# Patient Record
Sex: MALE | Race: BLACK OR AFRICAN AMERICAN | NOT HISPANIC OR LATINO | URBAN - METROPOLITAN AREA
[De-identification: names, ages, dates, MRNs, and addresses within clinical notes are randomized per-mention and may not be internally consistent; named-entity substitution may affect disease eponyms.]

---

## 2020-02-07 ENCOUNTER — OFFICE VISIT - HEALTHEAST (OUTPATIENT)
Dept: INTERNAL MEDICINE | Facility: CLINIC | Age: 65
End: 2020-02-07

## 2020-02-07 DIAGNOSIS — Z00.00 ROUTINE GENERAL MEDICAL EXAMINATION AT A HEALTH CARE FACILITY: ICD-10-CM

## 2020-02-07 DIAGNOSIS — Z12.11 SPECIAL SCREENING FOR MALIGNANT NEOPLASMS, COLON: ICD-10-CM

## 2020-02-07 ASSESSMENT — MIFFLIN-ST. JEOR: SCORE: 1668.83

## 2020-02-12 ENCOUNTER — AMBULATORY - HEALTHEAST (OUTPATIENT)
Dept: LAB | Facility: CLINIC | Age: 65
End: 2020-02-12

## 2020-02-12 DIAGNOSIS — Z00.00 ROUTINE GENERAL MEDICAL EXAMINATION AT A HEALTH CARE FACILITY: ICD-10-CM

## 2020-02-12 LAB
ALBUMIN SERPL-MCNC: 3.5 G/DL (ref 3.5–5)
ALP SERPL-CCNC: 63 U/L (ref 45–120)
ALT SERPL W P-5'-P-CCNC: 27 U/L (ref 0–45)
ANION GAP SERPL CALCULATED.3IONS-SCNC: 10 MMOL/L (ref 5–18)
AST SERPL W P-5'-P-CCNC: 30 U/L (ref 0–40)
BASOPHILS # BLD AUTO: 0.1 THOU/UL (ref 0–0.2)
BASOPHILS NFR BLD AUTO: 1 % (ref 0–2)
BILIRUB SERPL-MCNC: 0.5 MG/DL (ref 0–1)
BUN SERPL-MCNC: 12 MG/DL (ref 8–22)
CALCIUM SERPL-MCNC: 9 MG/DL (ref 8.5–10.5)
CHLORIDE BLD-SCNC: 106 MMOL/L (ref 98–107)
CHOLEST SERPL-MCNC: 159 MG/DL
CO2 SERPL-SCNC: 26 MMOL/L (ref 22–31)
CREAT SERPL-MCNC: 1.22 MG/DL (ref 0.7–1.3)
EOSINOPHIL # BLD AUTO: 0.4 THOU/UL (ref 0–0.4)
EOSINOPHIL NFR BLD AUTO: 9 % (ref 0–6)
ERYTHROCYTE [DISTWIDTH] IN BLOOD BY AUTOMATED COUNT: 13.4 % (ref 11–14.5)
FASTING STATUS PATIENT QL REPORTED: YES
GFR SERPL CREATININE-BSD FRML MDRD: 60 ML/MIN/1.73M2
GLUCOSE BLD-MCNC: 80 MG/DL (ref 70–125)
HCT VFR BLD AUTO: 48.6 % (ref 40–54)
HCV AB SERPL QL IA: NEGATIVE
HDLC SERPL-MCNC: 55 MG/DL
HGB BLD-MCNC: 14.7 G/DL (ref 14–18)
LDLC SERPL CALC-MCNC: 92 MG/DL
LYMPHOCYTES # BLD AUTO: 1 THOU/UL (ref 0.8–4.4)
LYMPHOCYTES NFR BLD AUTO: 22 % (ref 20–40)
MCH RBC QN AUTO: 28.5 PG (ref 27–34)
MCHC RBC AUTO-ENTMCNC: 30.2 G/DL (ref 32–36)
MCV RBC AUTO: 94 FL (ref 80–100)
MONOCYTES # BLD AUTO: 0.5 THOU/UL (ref 0–0.9)
MONOCYTES NFR BLD AUTO: 10 % (ref 2–10)
NEUTROPHILS # BLD AUTO: 2.7 THOU/UL (ref 2–7.7)
NEUTROPHILS NFR BLD AUTO: 58 % (ref 50–70)
PLATELET # BLD AUTO: 286 THOU/UL (ref 140–440)
PMV BLD AUTO: 10.7 FL (ref 8.5–12.5)
POTASSIUM BLD-SCNC: 4.8 MMOL/L (ref 3.5–5)
PROT SERPL-MCNC: 5.9 G/DL (ref 6–8)
PSA SERPL-MCNC: 0.7 NG/ML (ref 0–4.5)
RBC # BLD AUTO: 5.16 MILL/UL (ref 4.4–6.2)
SODIUM SERPL-SCNC: 142 MMOL/L (ref 136–145)
TRIGL SERPL-MCNC: 60 MG/DL
WBC: 4.7 THOU/UL (ref 4–11)

## 2020-02-13 LAB — 25(OH)D3 SERPL-MCNC: 13.9 NG/ML (ref 30–80)

## 2020-02-14 ENCOUNTER — COMMUNICATION - HEALTHEAST (OUTPATIENT)
Dept: INTERNAL MEDICINE | Facility: CLINIC | Age: 65
End: 2020-02-14

## 2020-02-14 ENCOUNTER — AMBULATORY - HEALTHEAST (OUTPATIENT)
Dept: INTERNAL MEDICINE | Facility: CLINIC | Age: 65
End: 2020-02-14

## 2020-02-14 DIAGNOSIS — E55.9 VITAMIN D DEFICIENCY: ICD-10-CM

## 2020-03-27 ENCOUNTER — VIRTUAL VISIT (OUTPATIENT)
Dept: FAMILY MEDICINE | Facility: OTHER | Age: 65
End: 2020-03-27

## 2020-03-27 NOTE — PROGRESS NOTES
"Date: 2020 09:40:51  Clinician: Jessica Doherty  Clinician NPI: [id number]  Patient: JULIENNE BURRIS  Patient : 1955  Patient Address: [address], [place] [address]  Patient Phone: [phone]  Visit Protocol: URI  Patient Summary:  JULIENNE is a 64 year old ( : 1955 ) male who initiated a Visit for COVID-19 (Coronavirus) evaluation and screening. When asked the question \"Please sign me up to receive news, health information and promotions from [employer].\", JULIENNE responded \"Yes\".    JULIENNE states his symptoms started suddenly 3-6 days ago. After his symptoms started, they improved and then got worse again.   His symptoms consist of rhinitis, a cough, and malaise.   Symptom details     Nasal secretions: The color of his mucus is clear.    Cough: JULIENNE coughs a few times an hour and his cough is not more bothersome at night. Phlegm does not come into his throat when he coughs. He does not believe his cough is caused by post-nasal drip.      JULIENNE denies having ear pain, wheezing, sore throat, nasal congestion, teeth pain, headache, fever, facial pain or pressure, myalgias, and chills. He also denies taking antibiotic medication for the symptoms and having recent facial or sinus surgery in the past 60 days. He is not experiencing dyspnea.   Precipitating events  He has not recently been exposed to someone with influenza. JULIENNE has not been in close contact with any high risk individuals.   Pertinent COVID-19 (Coronavirus) information  JULIENNE has not traveled internationally or to the areas where COVID-19 (Coronavirus) is widespread, including cruise ship travel in the last 14 days before the start of his symptoms.   JULIENNE has had a close contact with a laboratory-confirmed COVID-19 patient within 14 days of symptom onset. He also has had a close contact with a suspected COVID-19 patient within 14 days of symptom onset. Additional information about contact with COVID-19 (Coronavirus) patient " as reported by the patient (free text): Potential contact with a person at my work site   JULIENNE is not a healthcare worker or a  and does not work in a healthcare facility. He does not live with a healthcare worker.   Pertinent medical history  JULIENNE needs a return to work/school note.   Weight: 180 lbs   JULIENNE does not smoke or use smokeless tobacco.   Weight: 180 lbs    MEDICATIONS: No current medications, ALLERGIES: NKDA  Clinician Response:  Dear JULIENNE,   Based on the information you have provided, you do have symptoms that are consistent with Coronavirus (COVID-19).   The coronavirus causes mild to severe respiratory illness with the most common symptoms including fever, cough and difficulty breathing. Unfortunately, many viruses cause similar symptoms and it can be difficult to distinguish between viruses, especially in mild cases, so we are presuming that anyone with cough or fever has coronavirus at this time.  Coronavirus/COVID-19 has reached the point of community spread in [place], meaning that we are finding the virus in people with no known exposure risk for tierra the virus. Given the increasing commonness of coronavirus in the community we are no longer testing patients who are not critically ill.  If you are a health care worker, you should refer to your employee health office for instructions about testing and returning to work.  For everyone else who has cough or fever, you should assume you are infected with coronavirus. Since you will not be tested but have symptoms that may be consistent with coronavirus, the CDC recommends you stay in self-isolation until these three things have happened:    You have had no fever for at least 72 hours (that is three full days of no fever without the use of medicine that reduces fevers)    AND   Other symptoms have improved (for example, when your cough or shortness of breath have improved)   AND   At least 7 days have passed since your  symptoms first appeared.   How to Isolate:    Isolate yourself at home.   Do Not allow any visitors  Do Not go to work or school  Do Not go to [employer],  centers, shopping, or other public places.  Do Not shake hands.  Avoid close contact with others (hugging, kissing).   Protect Others:    Cover Your Mouth and Nose with a mask, disposable tissue or wash cloth to avoid spreading germs to others.  Wash your hands and face frequently with soap and water.   Managing Symptoms:    At this time, we primarily recommend Tylenol (Acetaminophen) for fever or pain. If you have liver or kidney problems, contact your primary care provider for instructions on use of tylenol. Adults can take 650 mg (two 325 mg pills) by mouth every 4-6 hours as needed OR 1,000 mg (two 500 mg pills) every 8 hours as needed. MAXIMUM DAILY DOSE: 3,000mg. For children, refer to dosing on bottle based on age or weight.   If you develop significant shortness of breath that prevents you from doing normal activities, please call 911 or proceed to the nearest emergency room and alert them immediately that you have been in self-isolation for possible coronavirus.   If you have a higher risk medical condition such as cancer, heart failure, end stage renal disease on dialysis or have a transplant, please reach out to your specialist's clinic to advise them of your [facility] visit should you not improve within the next two days.  For more information about COVID19 and options for caring for yourself at home, please visit the CDC website at https://www.cdc.gov/coronavirus/2019-ncov/about/steps-when-sick.htmlFor more options for care at [facility], please visit our website at [email or web address]     Diagnosis: Coronavirus infection, unspecified  Diagnosis ICD: B34.2

## 2021-06-04 VITALS
HEART RATE: 77 BPM | RESPIRATION RATE: 16 BRPM | SYSTOLIC BLOOD PRESSURE: 102 MMHG | WEIGHT: 187.12 LBS | OXYGEN SATURATION: 96 % | HEIGHT: 72 IN | BODY MASS INDEX: 25.35 KG/M2 | DIASTOLIC BLOOD PRESSURE: 64 MMHG

## 2021-06-05 NOTE — PATIENT INSTRUCTIONS - HE
General preventive exam and establishing primary care for this 64-year-old man who recently relocated to [place] from [facility], to take a job as an .  He has historically been very healthy.  His physical exam is normal, including a body mass index of 25 and excellent blood pressure.  He is overdue for colon cancer screening, so I ordered his first ever screening colonoscopy.  We are going to get a set of preventive blood tests, that I would like to have him draw fasting (meaning no food for 10 hours, water okay).  Those will include comprehensive metabolic panel, lipid profile, blood cell counts, and also hepatitis C as population-based age-appropriate testing.  Also will check vitamin D level.    I suggested that he consider strength training to build and maintain lean muscle mass.  He told me that he enjoys 1 or 2 beers most days of the week.  I told him that it is a source of calories, and he could trim off a few pounds by curtailing that.  He is never been a smoker.  He is careful with his diet.    Regarding vaccinations, I asked him to consider getting the shingles vaccine, and also tetanus booster.  He declines influenza vaccination today.    History of low vitamin D, for which hs doctor in [place] put him on a supplement    Bilateral earwax, I recommended that he use an over-the-counter wax dissolving earwax kit, example brands Debrox and Murine.    Seasonal vasomotor rhinitis. He told me that he gets nasal congestion in the in the wintertime.  I told him that he could consider trying an over-the-counter nasal steroid such as fluticasone (known under the brand name Flonase).    He is going to have an eye exam in the next couple weeks, important to screen for glaucoma and cataracts.

## 2021-06-05 NOTE — PROGRESS NOTES
Office Visit - New Patient   Keith Jaquez   64 y.o.  male    Date of visit: 2/7/2020  Physician: Roly Hou MD     Assessment and Plan     General preventive exam and establishing primary care for this 64-year-old man who recently relocated to [place] from [facility], to take a job as an .  He has historically been very healthy.  His physical exam is normal, including a body mass index of 25 and excellent blood pressure.  He is overdue for colon cancer screening, so I ordered his first ever screening colonoscopy.  We are going to get a set of preventive blood tests, that I would like to have him draw fasting (meaning no food for 10 hours, water okay).  Those will include comprehensive metabolic panel, lipid profile, blood cell counts, and also hepatitis C as population-based age-appropriate testing.  Also will check vitamin D level.    I suggested that he consider strength training to build and maintain lean muscle mass.  He told me that he enjoys 1 or 2 beers most days of the week.  I told him that it is a source of calories, and he could trim off a few pounds by curtailing that.  He is never been a smoker.  He is careful with his diet.    Regarding vaccinations, I asked him to consider getting the shingles vaccine, and also tetanus booster.  He declines influenza vaccination today.    Overdue for colon cancer screening, colonoscopy ordered    History of low vitamin D, for which hs doctor in [place] put him on a supplement    Bilateral earwax, I recommended that he use an over-the-counter wax dissolving earwax kit, example brands Debrox and Murine.    Seasonal vasomotor rhinitis. He told me that he gets nasal congestion in the in the wintertime.  I told him that he could consider trying an over-the-counter nasal steroid such as fluticasone (known under the brand name Flonase).    He is going to have an eye exam in the next couple weeks, important to screen for glaucoma and  cataracts.         Chief Complaint   Chief Complaint   Patient presents with     Annual Exam        History of Present Illness   This 64 y.o. old General preventive exam and establishing primary care for this 64-year-old man who recently relocated to [place] from [facility], to take a job as an .  He has historically been very healthy.  His physical exam is normal, including a body mass index of 25 and excellent blood pressure.  He is overdue for colon cancer screening, so I ordered his first ever screening colonoscopy.  We are going to get a set of preventive blood tests, that I would like to have him draw fasting (meaning no food for 10 hours, water okay).  Those will include comprehensive metabolic panel, lipid profile, blood cell counts, and also hepatitis C as population-based age-appropriate testing.  Also will check vitamin D level.    He told me he feels great, has no symptomatic complaints at all.  He told me he is historically healthy, no serious illnesses in the past, no surgeries  We requested records from [facility], where he was getting medical care.  He told me there is not much there.      There is no immunization history on file for this patient.    Review of Systems: A comprehensive review of systems was negative except as noted.     Medications and Allergies   No current outpatient medications on file.     No current facility-administered medications for this visit.      No Known Allergies     Family and Social History   Family History   Problem Relation Age of Onset     Stroke Mother      Cancer Father         throat cancer, smoker     Lung cancer Sister         Social History     Tobacco Use     Smoking status: Never Smoker     Smokeless tobacco: Never Used   Substance Use Topics     Alcohol use: Yes     Frequency: 4 or more times a week     Drinks per session: 1 or 2     Drug use: Not on file        Physical Exam   General Appearance:   Very pleasant,  "reasonable weight, excellent blood pressure, breathing comfortably, moves easily around exam room, normal mental status    /64 (Patient Site: Right Arm, Patient Position: Sitting, Cuff Size: Adult Regular)   Pulse 77   Resp 16   Ht 5' 11.5\" (1.816 m)   Wt 187 lb 1.9 oz (84.9 kg)   SpO2 96%   BMI 25.73 kg/m      General: Alert, in no distress  Skin: No significant lesion seen.  Eyes/nose/throat: Eyes without scleral icterus, eye movements normal, pupils equal and reactive, oropharynx clear, ears with normal TM's  MSK: Neck with good ROM  Lymphatic: Neck without adenopathy or masses  Endocrine: Thyroid with no nodules to palpation  Pulm: Lungs clear to auscultation bilaterally  Cardiac: Heart with regular rate and rhythm, no murmur or gallop  GI: Abdomen soft, nontender. No palpable enlargement of liver or spleen  MSK: Extremities no tenderness or edema  Neuro: Moves all extremities, without focal weakness  Psych: Alert, normal mental status. Normal affect and speech  Prostate normal size, smooth, no nodularity       Additional Information        Roly Hou MD  Internal Medicine    "

## 2021-06-16 PROBLEM — E55.9 VITAMIN D DEFICIENCY: Status: ACTIVE | Noted: 2020-02-14

## 2021-06-20 NOTE — LETTER
Letter by Roly Hou MD at      Author: Roly Hou MD Service: -- Author Type: --    Filed:  Encounter Date: 2/14/2020 Status: (Other)         Keith Jaquez  [address]  [place] [address]             February 14, 2020         Dear Mr. Jaquez,    Below are the results from your recent visit:    Resulted Orders   Comprehensive Metabolic Panel   Result Value Ref Range    Sodium 142 136 - 145 mmol/L    Potassium 4.8 3.5 - 5.0 mmol/L    Chloride 106 98 - 107 mmol/L    CO2 26 22 - 31 mmol/L    Anion Gap, Calculation 10 5 - 18 mmol/L    Glucose 80 70 - 125 mg/dL    BUN 12 8 - 22 mg/dL    Creatinine 1.22 0.70 - 1.30 mg/dL    GFR MDRD Af Amer >60 >60 mL/min/1.73m2    GFR MDRD Non Af Amer 60 (L) >60 mL/min/1.73m2    Bilirubin, Total 0.5 0.0 - 1.0 mg/dL    Calcium 9.0 8.5 - 10.5 mg/dL    Protein, Total 5.9 (L) 6.0 - 8.0 g/dL    Albumin 3.5 3.5 - 5.0 g/dL    Alkaline Phosphatase 63 45 - 120 U/L    AST 30 0 - 40 U/L    ALT 27 0 - 45 U/L    Narrative    Fasting Glucose reference range is 70-99 mg/dL per  American Diabetes Association (ADA) guidelines.   Lipid Cascade   Result Value Ref Range    Cholesterol 159 <=199 mg/dL    Triglycerides 60 <=149 mg/dL    HDL Cholesterol 55 >=40 mg/dL    LDL Calculated 92 <=129 mg/dL    Patient Fasting > 8hrs? Yes    PSA (Prostatic-Specific Antigen), Annual Screen   Result Value Ref Range    PSA 0.7 0.0 - 4.5 ng/mL    Narrative    Method is Abbott Prostate-Specific Antigen (PSA)  Standard-WHO 1st International (90:10)   Hepatitis C Antibody (Anti-HCV)   Result Value Ref Range    Hepatitis C Ab Negative Negative   Vitamin D, Total (25-Hydroxy)   Result Value Ref Range    Vitamin D, Total (25-Hydroxy) 13.9 (L) 30.0 - 80.0 ng/mL    Narrative    Deficiency <10.0 ng/mL  Insufficiency 10.0-29.9 ng/mL  Sufficiency 30.0-80.0 ng/mL  Toxicity (possible) >100.0 ng/mL   HM1 (CBC with Diff)   Result Value Ref Range    WBC 4.7 4.0 - 11.0 thou/uL    RBC 5.16 4.40 - 6.20 mill/uL  "   Hemoglobin 14.7 14.0 - 18.0 g/dL    Hematocrit 48.6 40.0 - 54.0 %    MCV 94 80 - 100 fL    MCH 28.5 27.0 - 34.0 pg    MCHC 30.2 (L) 32.0 - 36.0 g/dL    RDW 13.4 11.0 - 14.5 %    Platelets 286 140 - 440 thou/uL    MPV 10.7 8.5 - 12.5 fL    Neutrophils % 58 50 - 70 %    Lymphocytes % 22 20 - 40 %    Monocytes % 10 2 - 10 %    Eosinophils % 9 (H) 0 - 6 %    Basophils % 1 0 - 2 %    Neutrophils Absolute 2.7 2.0 - 7.7 thou/uL    Lymphocytes Absolute 1.0 0.8 - 4.4 thou/uL    Monocytes Absolute 0.5 0.0 - 0.9 thou/uL    Eosinophils Absolute 0.4 0.0 - 0.4 thou/uL    Basophils Absolute 0.1 0.0 - 0.2 thou/uL        \"Enclosed are your test results from your recent clinic visit with me.  Your vitamin D level came back  quite low.  I have transmitted a prescription  to your pharmacy to take high-dose vitamin D in the form  of ergocalciferol, 50,000 units taken once a WEEK for 12 weeks.  Afterwards, you would go on a much  smaller daily dose of over-the-counter vitamin D (cholecalciferol) 1000 units daily.      The rest of your test results look good.  Your lipid profile is excellent.  Your comprehensive metabolic  panel looks fine, the GFR calculation is not cause for concern.  Your blood cell counts are fine as well,  eosinophil proportion not significant.  PSA prostate test completely normal.  Hepatitis C test negative.     Please call with questions or contact us using [facility].    Sincerely,        Electronically signed by Roly Hou MD       "